# Patient Record
Sex: MALE | Race: BLACK OR AFRICAN AMERICAN | Employment: UNEMPLOYED | URBAN - METROPOLITAN AREA
[De-identification: names, ages, dates, MRNs, and addresses within clinical notes are randomized per-mention and may not be internally consistent; named-entity substitution may affect disease eponyms.]

---

## 2023-01-17 ENCOUNTER — HOSPITAL ENCOUNTER (EMERGENCY)
Age: 63
Discharge: HOME | End: 2023-01-17
Attending: EMERGENCY MEDICINE

## 2023-01-17 VITALS
HEART RATE: 78 BPM | TEMPERATURE: 97.6 F | SYSTOLIC BLOOD PRESSURE: 202 MMHG | OXYGEN SATURATION: 93 % | RESPIRATION RATE: 12 BRPM | DIASTOLIC BLOOD PRESSURE: 102 MMHG

## 2023-01-17 DIAGNOSIS — R41.82 ALTERED MENTAL STATUS, UNSPECIFIED ALTERED MENTAL STATUS TYPE: Primary | ICD-10-CM

## 2023-01-17 LAB
CHP ED QC CHECK: YES
GLUCOSE BLD-MCNC: 172 MG/DL
GLUCOSE BLD-MCNC: 172 MG/DL (ref 75–110)

## 2023-01-17 PROCEDURE — 99283 EMERGENCY DEPT VISIT LOW MDM: CPT

## 2023-01-17 PROCEDURE — 93005 ELECTROCARDIOGRAM TRACING: CPT | Performed by: STUDENT IN AN ORGANIZED HEALTH CARE EDUCATION/TRAINING PROGRAM

## 2023-01-17 PROCEDURE — 82947 ASSAY GLUCOSE BLOOD QUANT: CPT

## 2023-01-17 PROCEDURE — 6370000000 HC RX 637 (ALT 250 FOR IP): Performed by: STUDENT IN AN ORGANIZED HEALTH CARE EDUCATION/TRAINING PROGRAM

## 2023-01-17 RX ORDER — NALOXONE HYDROCHLORIDE 4 MG/.1ML
1 SPRAY NASAL ONCE
Status: COMPLETED | OUTPATIENT
Start: 2023-01-17 | End: 2023-01-17

## 2023-01-17 RX ADMIN — NALOXONE HYDROCHLORIDE NASAL SPRAY 1 SPRAY: 4 INHALANT NASAL at 21:25

## 2023-01-17 ASSESSMENT — ENCOUNTER SYMPTOMS
FACIAL SWELLING: 0
ABDOMINAL PAIN: 0
SHORTNESS OF BREATH: 0
ALLERGIC/IMMUNOLOGIC COMMENTS: NKA
BACK PAIN: 0

## 2023-01-17 ASSESSMENT — PAIN - FUNCTIONAL ASSESSMENT: PAIN_FUNCTIONAL_ASSESSMENT: NONE - DENIES PAIN

## 2023-01-18 LAB
EKG ATRIAL RATE: 84 BPM
EKG P AXIS: 75 DEGREES
EKG P-R INTERVAL: 184 MS
EKG Q-T INTERVAL: 406 MS
EKG QRS DURATION: 104 MS
EKG QTC CALCULATION (BAZETT): 479 MS
EKG R AXIS: 17 DEGREES
EKG T AXIS: 72 DEGREES
EKG VENTRICULAR RATE: 84 BPM

## 2023-01-18 PROCEDURE — 93010 ELECTROCARDIOGRAM REPORT: CPT | Performed by: INTERNAL MEDICINE

## 2023-01-18 NOTE — ED PROVIDER NOTES
[facility]     Emergency Department     Faculty Attestation    I performed a history and physical examination of the patient and discussed management with the resident. I reviewed the residents note and agree with the documented findings and plan of care. Any areas of disagreement are noted on the chart. I was personally present for the key portions of any procedures. I have documented in the chart those procedures where I was not present during the key portions. I have reviewed the emergency nurses triage note. I agree with the chief complaint, past medical history, past surgical history, allergies, medications, social and family history as documented unless otherwise noted below. For Physician Assistant/ Nurse Practitioner cases/documentation I have personally evaluated this patient and have completed at least one if not all key elements of the E/M (history, physical exam, and MDM). Additional findings are as noted. I have personally seen and evaluated the patient. I find the patient's history and physical exam are consistent with the NP/PA documentation. I agree with the care provided, treatment rendered, disposition and follow-up plan. 52-year-old male presenting in police custody after suspected overdose. Patient was found unconscious across from a home that per police is a known drug house. Patient denies using any substances other than smoking a joint. He was given 2 mg of IV Narcan prior to arrival (approximately 1 hour before arrival). He denies any chest pain or shortness of breath. He does have a history of high blood pressure and is not currently on medications. Exam:  General : Laying on the bed, awake, alert, and in no acute distress  CV : normal rate and regular rhythm  Lungs : Breathing comfortably on room air with no tachypnea, hypoxia, or increased work of breathing    Plan:  Patient asymptomatic at this time.   Although he is hypertensive, will recommend that he follows up with his PCP for initiation of antihypertensives as he is asymptomatic. Will monitor to ensure no recurrence of altered mental status or bradypnea requiring additional Narcan doses. If no further doses are needed, will discharge.     Kimmy Han MD   Attending Emergency Physician    (Please note that portions of this note were completed with a voice recognition program. Efforts were made to edit the dictations but occasionally words are mis-transcribed.)            Kimmy Han MD  01/18/23 0002

## 2023-01-18 NOTE — DISCHARGE INSTRUCTIONS
Medically cleared to return to police custody. Thank you for coming to [facility]. [place]'s emergency department! You were seen today for altered mental status. Please establish care with a primary care provider. For any concerns please return to the emergency department.

## 2023-01-18 NOTE — ED TRIAGE NOTES
Police on site after a call from unknown person stated pt appeared to be over dosing   Pt states he smoked a joint   Denies any other substance use  Fire medic gave 2 mg Iv Narcan , pt woke up alert and oriented   Talking in clear sentences, states he here for high blood pressure issues

## 2023-01-18 NOTE — ED PROVIDER NOTES
[address] ED  Emergency Department Encounter  Emergency Medicine Resident     Pt Khurram Li  MRN: [id number]  [place] 1960  Date of evaluation: 1/17/23  PCP:  No primary care provider on file. Note Started: 8:51 PM EST      CHIEF COMPLAINT       Chief Complaint   Patient presents with    Drug Overdose     Received 2mg narcan via nasal in police custody      HISTORY OF PRESENT ILLNESS  (Location/Symptom, Timing/Onset, Context/Setting, Quality, Duration, Modifying Factors, Severity.)      Ethan Woodruff is a 65 y.o. male who presents after being given 2 mg of Narcan nasally by police. Patient reports that he was found unconscious. Denies any drug use today. Denies any complaints. Denies any pain anywhere. Patient does report that he has a history of high blood pressure and diabetes for which he takes no medications. Does not see a primary care provider. Has not checked his blood sugar recently. Per police staff, patient was found unconscious and given Narcan.     PAST MEDICAL / SURGICAL / SOCIAL / FAMILY HISTORY   PMH hypertension, diabetes  PSH none    Social History     Socioeconomic History    Marital status: Single     Spouse name: Not on file    Number of children: Not on file    Years of education: Not on file    Highest education level: Not on file   Occupational History    Not on file   Tobacco Use    Smoking status: Not on file    Smokeless tobacco: Not on file   Substance and Sexual Activity    Alcohol use: Not on file    Drug use: Not on file    Sexual activity: Not on file   Other Topics Concern    Not on file   Social History Narrative    Not on file     Social Determinants of Health     Financial Resource Strain: Not on file   Food Insecurity: Not on file   Transportation Needs: Not on file   Physical Activity: Not on file   Stress: Not on file   Social Connections: Not on file   Intimate Partner Violence: Not on file   Housing Stability: Not on file No family history on file. Allergies:  Patient has no known allergies. Home Medications:  Prior to Admission medications    Not on File     REVIEW OF SYSTEMS       Review of Systems   Constitutional:  Negative for appetite change. HENT:  Negative for facial swelling. Respiratory:  Negative for shortness of breath. Cardiovascular:  Negative for chest pain. Gastrointestinal:  Negative for abdominal pain. Musculoskeletal:  Negative for back pain. Skin:  Negative for wound. Allergic/Immunologic:        NKA   Neurological:  Negative for headaches. Hematological:         - AC   Psychiatric/Behavioral:  Negative for confusion. PHYSICAL EXAM      INITIAL VITALS:   BP (!) 202/102   Pulse 78   Temp 97.6 °F (36.4 °C) (Oral)   Resp 12   SpO2 93%     Physical Exam  Constitutional:       General: He is not in acute distress. HENT:      Head: Normocephalic and atraumatic. Right Ear: External ear normal.      Left Ear: External ear normal.      Nose: Nose normal.   Eyes:      Conjunctiva/sclera: Conjunctivae normal.   Cardiovascular:      Rate and Rhythm: Normal rate. Pulses: Normal pulses. Pulmonary:      Effort: Pulmonary effort is normal. No respiratory distress. Abdominal:      General: Abdomen is flat. There is no distension. Palpations: Abdomen is soft. Tenderness: There is no abdominal tenderness. There is no guarding or rebound. Musculoskeletal:         General: No swelling. Cervical back: No tenderness. Skin:     General: Skin is warm. Capillary Refill: Capillary refill takes less than 2 seconds. Neurological:      Mental Status: He is alert and oriented to person, place, and time.    Psychiatric:         Mood and Affect: Mood normal.     DDX/DIAGNOSTIC RESULTS / EMERGENCY DEPARTMENT COURSE / MDM     Medical Decision Making  Patient is a 72-year-old gentleman presenting after being found unconscious and receiving 2 mg of Narcan at 8 PM.  He does have a history of high blood pressure and diabetes for which she takes no medications and does not see a physician for. He denies any drug use. Examination is benign, lungs clear to auscultation bilaterally, abdomen soft nontender nondistended. Patient is alert and oriented x3. Blood sugar was checked and is within normal limits. Plan to discharge at this time. Amount and/or Complexity of Data Reviewed  Independent Historian:      Details: police  Labs: ordered. Decision-making details documented in ED Course. Risk  Prescription drug management. EMERGENCY DEPARTMENT COURSE:    ED Course as of 01/17/23 2134 Tue Jan 17, 2023 2045 Glucose, Random: 172 [ML]   2112 Patient denies any drug use however did regain consciousness after Narcan use this evening. We will be giving a Narcan kit to go. Medically cleared to return to police custody. [ML]      ED Course User Index  [ML] Amada Alvarez DO       FINAL IMPRESSION      1.  Altered mental status, unspecified altered mental status type          DISPOSITION / PLAN     DISPOSITION Decision To Discharge 01/17/2023 08:55:09 PM    PATIENT REFERRED TO:  [facility]  [address] [address] [phone]  Schedule an appointment as soon as possible for a visit       Ibis Ford DO  Emergency Medicine Resident    (Please note that portions of thisnote were completed with a voice recognition program.  Efforts were made to edit the dictations but occasionally words are mis-transcribed.)       Amada Alvarez DO  Resident  01/17/23 2134

## 2023-03-12 ENCOUNTER — HOSPITAL ENCOUNTER (EMERGENCY)
Age: 63
Discharge: HOME | End: 2023-03-13
Attending: EMERGENCY MEDICINE
Payer: COMMERCIAL

## 2023-03-12 DIAGNOSIS — R04.0 EPISTAXIS: Primary | ICD-10-CM

## 2023-03-12 PROCEDURE — 99283 EMERGENCY DEPT VISIT LOW MDM: CPT

## 2023-03-12 PROCEDURE — 93005 ELECTROCARDIOGRAM TRACING: CPT

## 2023-03-12 PROCEDURE — 6370000000 HC RX 637 (ALT 250 FOR IP)

## 2023-03-12 PROCEDURE — 30901 CONTROL OF NOSEBLEED: CPT

## 2023-03-12 RX ORDER — OXYMETAZOLINE HYDROCHLORIDE 0.05 G/100ML
1 SPRAY NASAL ONCE
Status: COMPLETED | OUTPATIENT
Start: 2023-03-12 | End: 2023-03-12

## 2023-03-12 RX ADMIN — NASAL DECONGESTANT 1 SPRAY: 0.05 SPRAY NASAL at 23:36

## 2023-03-12 ASSESSMENT — ENCOUNTER SYMPTOMS
VOMITING: 0
COLOR CHANGE: 0
SINUS PAIN: 1
SHORTNESS OF BREATH: 0
FACIAL SWELLING: 0
NAUSEA: 0

## 2023-03-13 VITALS
RESPIRATION RATE: 16 BRPM | HEART RATE: 98 BPM | HEIGHT: 73 IN | WEIGHT: 190 LBS | BODY MASS INDEX: 25.18 KG/M2 | DIASTOLIC BLOOD PRESSURE: 95 MMHG | OXYGEN SATURATION: 94 % | TEMPERATURE: 98.3 F | SYSTOLIC BLOOD PRESSURE: 165 MMHG

## 2023-03-13 VITALS
SYSTOLIC BLOOD PRESSURE: 152 MMHG | OXYGEN SATURATION: 95 % | HEART RATE: 88 BPM | WEIGHT: 190 LBS | TEMPERATURE: 98.5 F | RESPIRATION RATE: 18 BRPM | DIASTOLIC BLOOD PRESSURE: 90 MMHG

## 2023-03-13 DIAGNOSIS — R04.0 EPISTAXIS: Primary | ICD-10-CM

## 2023-03-13 LAB
ABSOLUTE EOS #: 0.15 K/UL (ref 0–0.44)
ABSOLUTE IMMATURE GRANULOCYTE: 0.04 K/UL (ref 0–0.3)
ABSOLUTE LYMPH #: 0.94 K/UL (ref 1.1–3.7)
ABSOLUTE MONO #: 0.42 K/UL (ref 0.1–1.2)
BASOPHILS # BLD: 1 % (ref 0–2)
BASOPHILS ABSOLUTE: 0.03 K/UL (ref 0–0.2)
EOSINOPHILS RELATIVE PERCENT: 4 % (ref 1–4)
HCT VFR BLD AUTO: 33.1 % (ref 40.7–50.3)
HGB BLD-MCNC: 10.6 G/DL (ref 13–17)
IMMATURE GRANULOCYTES: 1 %
INR PPP: 1
LYMPHOCYTES # BLD: 22 % (ref 24–43)
MCH RBC QN AUTO: 27.9 PG (ref 25.2–33.5)
MCHC RBC AUTO-ENTMCNC: 32 G/DL (ref 28.4–34.8)
MCV RBC AUTO: 87.1 FL (ref 82.6–102.9)
MONOCYTES # BLD: 10 % (ref 3–12)
NRBC AUTOMATED: 0 PER 100 WBC
PARTIAL THROMBOPLASTIN TIME: 23.8 SEC (ref 20.5–30.5)
PDW BLD-RTO: 13.1 % (ref 11.8–14.4)
PLATELET # BLD AUTO: 223 K/UL (ref 138–453)
PMV BLD AUTO: 10.3 FL (ref 8.1–13.5)
PROTHROMBIN TIME: 10.4 SEC (ref 9.1–12.3)
RBC # BLD: 3.8 M/UL (ref 4.21–5.77)
SEG NEUTROPHILS: 62 % (ref 36–65)
SEGMENTED NEUTROPHILS ABSOLUTE COUNT: 2.75 K/UL (ref 1.5–8.1)
WBC # BLD AUTO: 4.3 K/UL (ref 3.5–11.3)

## 2023-03-13 PROCEDURE — 99283 EMERGENCY DEPT VISIT LOW MDM: CPT

## 2023-03-13 PROCEDURE — 85025 COMPLETE CBC W/AUTO DIFF WBC: CPT

## 2023-03-13 PROCEDURE — 85730 THROMBOPLASTIN TIME PARTIAL: CPT

## 2023-03-13 PROCEDURE — 85610 PROTHROMBIN TIME: CPT

## 2023-03-13 ASSESSMENT — ENCOUNTER SYMPTOMS
SHORTNESS OF BREATH: 0
FACIAL SWELLING: 0
SINUS PAIN: 0
VOMITING: 0
SINUS PRESSURE: 0
NAUSEA: 0
RHINORRHEA: 0
CHOKING: 0
COUGH: 0
TROUBLE SWALLOWING: 0
COLOR CHANGE: 0

## 2023-03-13 ASSESSMENT — PAIN - FUNCTIONAL ASSESSMENT: PAIN_FUNCTIONAL_ASSESSMENT: NONE - DENIES PAIN

## 2023-03-13 NOTE — DISCHARGE INSTRUCTIONS
You were seen and evaluated by emergency medicine physicians at [address] [place] [address] Drive had a nosebleed and high blood pressure; brought in by EMS. Your blood pressure improved in the emergency department. Your nosebleed was stopped with the application of a nose clamp and Afrin. You are being discharged with a nose clamp and instructions to reapply as these nosebleeds can recur. Keep nose clamp in place for 20 minutes. If the bleeding does not stop, please go to the nearest ED. Try to avoid going in and out of cold exterior environments into warm wet environments, which can cause nosebleeds. Please return to emergency room if experiencing the following symptoms acutely: Nosebleed that will not stop, dizziness, syncope, fatigue, spitting up blood, nausea, vomiting, chest pain, shortness of breath, abdominal pain, changes urination, change in bowel moods, pallor, changes in your skin/hair/nails and/or any change from baseline health.

## 2023-03-13 NOTE — ED PROVIDER NOTES
[facility] ED  Emergency Department  Emergency Medicine Resident Sign-out     Care of Dinah Trotter was assumed from Dr. Hector Olea and is being seen for Epistaxis (Pt arrives per EMS with epistaxis. Pt was seen earlier in ED for same complaint and discharge home. Pt reports he was sleeping and woke up around 0545 when he noticed bilateral nares bleeding. )  . The patient's initial evaluation and plan have been discussed with the prior provider who initially evaluated the patient. EMERGENCY DEPARTMENT COURSE / MEDICAL DECISION MAKING:       MEDICATIONS GIVEN:  No orders of the defined types were placed in this encounter. LABS / RADIOLOGY:     Labs Reviewed   CBC WITH AUTO DIFFERENTIAL - Abnormal; Notable for the following components:       Result Value    RBC 3.80 (*)     Hemoglobin 10.6 (*)     Hematocrit 33.1 (*)     Lymphocytes 22 (*)     Immature Granulocytes 1 (*)     Absolute Lymph # 0.94 (*)     All other components within normal limits   PROTIME-INR   APTT       No results found. RECENT VITALS:     Temp: 98.3 °F (36.8 °C),  Heart Rate: 98, Resp: 16, BP: (!) 165/95, SpO2: 94 %      This patient is a 65 y.o. Male with recurrent severe epistaxis. Seen in the ED less than 5 hours ago. Nose clamp and Afrin stop the bleed. Went home took his medications and woke up with new onset epistaxis. Currently not bleeding following 20-minute clamp. Ordered CBC and coags to rule out platelet and coagulation disorder. ED Course as of 03/13/23 1210   Mon Mar 13, 2023   0703 Patient is a 60-year-old male who presents to the emergency department within 4 hours of discharge for recurrent epistaxis from bilateral nares. Patient went home, took his medications and went to sleep; he then woke up felt drainage out of his nose and the back of his throat. Patient states that he used the nasal clamp that he went home with to no effect.     Patient denies any headache, fever, chills, shortness of breath, dizziness, fatigue, palpitations, nausea, vomiting. Concern for recurrent epistaxis bleed from anterior or posterior source. Unable to visualize a source of the bleeding using otoscope in the anterior part of the patient's nose. Patient has large clots that were firmly pulled from his bilateral nares and he coughed up several large blood clots. [AS]   3401 Will order CBC and coags as this is the 3rd severe nose bleed. [AS]   [id number] Care assumed. [SS]   5136 Will ambulate patient and reassess  [SS]   0848 Ambulated with no bleed, states he feels better [SS]      ED Course User Index  [AS] Ba Dhillon DO  [SS] Erika Price MD       OUTSTANDING TASKS / RECOMMENDATIONS:    F/u labs     FINAL IMPRESSION:     1. Epistaxis        DISPOSITION:         DISPOSITION:  [x]  Discharge   []  Transfer -    []  Admission -     []  Against Medical Advice   []  Eloped   FOLLOW-UP: [address] [place] [id number] [phone]       DISCHARGE MEDICATIONS: There are no discharge medications for this patient.          Erika Price MD  Emergency Medicine Resident  [facility]         Erika Price MD  Resident  03/13/23 1402

## 2023-03-13 NOTE — ED NOTES
Patient requesting SW set up a ride for him back to [place]. SW called there and they will send staff to pick patient up from the ED. Patient updated on ride status. Eva Guajardo.  [address], [address]  03/13/23 2809

## 2023-03-13 NOTE — ED NOTES
Report given to Sridevi OLIVEIRA  No change in patient status  Continues to rest quietly       Kailee Sosa RN  03/13/23 0009

## 2023-03-13 NOTE — ED NOTES
Epistaxis appears to have slowed down extremely  Clamp remains on nose  Dr. Chang Goins at bedside to reassess pt     Enedina Padron RN  03/13/23 0000

## 2023-03-13 NOTE — ED NOTES
Bleeding to bilateral nares currently controlled at this time after applying pressure to bridge of nose with clamp. Will cont to monitor. Call light within reach.       Reynaldo Reis RN  03/13/23 0870

## 2023-03-13 NOTE — ED PROVIDER NOTES
[address]  ED  Emergency Department Encounter  Emergency Medicine Resident     Pt Natalio Navarro  MRN: [id number]  [place] 1960  Date of evaluation: 3/13/23  PCP:  No primary care provider on file. Note Started: 7:03 AM EDT      CHIEF COMPLAINT       Chief Complaint   Patient presents with    Epistaxis     Pt arrives per EMS with epistaxis. Pt was seen earlier in ED for same complaint and discharge home. Pt reports he was sleeping and woke up around 0545 when he noticed bilateral nares bleeding. HISTORY OF PRESENT ILLNESS  (Location/Symptom, Timing/Onset, Context/Setting, Quality, Duration, Modifying Factors, Severity.)      Akilah Reddy is a 65 y.o. male who presents to the emergency department within 4 hours of discharge for recurrent epistaxis from bilateral nares. Patient went home, took his medications and went to sleep; he then woke up felt drainage out of his nose and the back of his throat. Patient states that he used the nasal clamp that he went home with to no effect. Patient denies any headache, fever, chills, shortness of breath, dizziness, fatigue, palpitations, nausea, vomiting. Patient does not have a Hx of  epistaxis prior to these back-to-back bleeds. Does not pick his nose. He is not on any anticoagulants. PAST MEDICAL / SURGICAL / SOCIAL / FAMILY HISTORY      has a past medical history of Hypertension. Severe nose bleeds     has no past surgical history on file.   No reported surgery    Social History     Socioeconomic History    Marital status: Single     Spouse name: Not on file    Number of children: Not on file    Years of education: Not on file    Highest education level: Not on file   Occupational History    Not on file   Tobacco Use    Smoking status: Every Day     Types: Cigarettes    Smokeless tobacco: Not on file   Substance and Sexual Activity    Alcohol use: Not Currently    Drug use: Never    Sexual activity: Not on file Other Topics Concern    Not on file   Social History Narrative    Not on file     Social Determinants of Health     Financial Resource Strain: Not on file   Food Insecurity: Not on file   Transportation Needs: Not on file   Physical Activity: Not on file   Stress: Not on file   Social Connections: Not on file   Intimate Partner Violence: Not on file   Housing Stability: Not on file       History reviewed. No pertinent family history. Allergies:  Patient has no known allergies. Home Medications:  Prior to Admission medications    Not on File       REVIEW OF SYSTEMS       Review of Systems   Constitutional:  Negative for activity change and appetite change. HENT:  Positive for congestion, nosebleeds and postnasal drip. Negative for facial swelling, rhinorrhea, sinus pressure, sinus pain and trouble swallowing. Eyes:  Negative for visual disturbance. Respiratory:  Negative for cough, choking and shortness of breath. Cardiovascular:  Negative for chest pain. Gastrointestinal:  Negative for nausea and vomiting. Skin:  Negative for color change and pallor. Neurological:  Negative for dizziness, weakness, light-headedness, numbness and headaches. PHYSICAL EXAM      INITIAL VITALS:   BP (!) 165/95   Pulse 98   Temp 98.3 °F (36.8 °C) (Oral)   Resp 16   Ht 6' 1\" (1.854 m)   Wt 190 lb (86.2 kg)   SpO2 94%   BMI 25.07 kg/m²     Physical Exam  Vitals and nursing note reviewed. Constitutional:       General: He is not in acute distress. Appearance: Normal appearance. He is normal weight. He is not ill-appearing, toxic-appearing or diaphoretic. HENT:      Head: Normocephalic. Right Ear: External ear normal.      Left Ear: External ear normal.      Nose: Congestion present.       Comments: Bilateral epistaxis with large clots in the nose; patient is coughing up large bloody clots from oral pharynx     Mouth/Throat:      Pharynx: No oropharyngeal exudate or posterior oropharyngeal erythema. Eyes:      General: No scleral icterus. Right eye: No discharge. Left eye: No discharge. Extraocular Movements: Extraocular movements intact. Conjunctiva/sclera: Conjunctivae normal.      Pupils: Pupils are equal, round, and reactive to light. Cardiovascular:      Rate and Rhythm: Normal rate and regular rhythm. Pulses: Normal pulses. Heart sounds: Normal heart sounds. Pulmonary:      Effort: Pulmonary effort is normal.      Breath sounds: Normal breath sounds. Abdominal:      General: Abdomen is flat. Palpations: Abdomen is soft. Musculoskeletal:         General: No swelling, tenderness, deformity or signs of injury. Normal range of motion. Cervical back: Normal range of motion. Right lower leg: No edema. Left lower leg: No edema. Skin:     General: Skin is warm and dry. Capillary Refill: Capillary refill takes less than 2 seconds. Neurological:      General: No focal deficit present. Mental Status: He is alert and oriented to person, place, and time. Psychiatric:         Mood and Affect: Mood normal.         Behavior: Behavior normal.         Thought Content: Thought content normal.         Judgment: Judgment normal.         DDX/DIAGNOSTIC RESULTS / EMERGENCY DEPARTMENT COURSE / MDM     Medical Decision Making  See ED course. Amount and/or Complexity of Data Reviewed  External Data Reviewed: notes. Labs: ordered. Critical Care  Total time providing critical care: < 30 minutes      EKG    All EKG's are interpreted by the Emergency Department Physician who either signs or Co-signs this chart in the absence of a cardiologist.    EMERGENCY DEPARTMENT COURSE:    ED Course as of 03/13/23 0746   Mon Mar 13, 2023   0703 Patient is a 73-year-old male who presents to the emergency department within 4 hours of discharge for recurrent epistaxis from bilateral nares.   Patient went home, took his medications and went to sleep; he then woke up felt drainage out of his nose and the back of his throat. Patient states that he used the nasal clamp that he went home with to no effect. Patient denies any headache, fever, chills, shortness of breath, dizziness, fatigue, palpitations, nausea, vomiting. Concern for recurrent epistaxis bleed from anterior or posterior source. Unable to visualize a source of the bleeding using otoscope in the anterior part of the patient's nose. Patient has large clots that were firmly pulled from his bilateral nares and he coughed up several large blood clots. [AS]   7985 Will order CBC and coags as this is the 3rd severe nose bleed. [AS]   [id number] Care assumed. [SS]      ED Course User Index  [AS] Erika Dutta DO  [SS] Corrinne Hipps, MD       PROCEDURES:  None    CONSULTS:  None    CRITICAL CARE:  There was significant risk of life threatening deterioration of patient's condition requiring my direct management. Critical care time < 30 minutes, excluding any documented procedures. FINAL IMPRESSION      1. Epistaxis          DISPOSITION / PLAN     DISPOSITION    -Pending final work up with labs. Patient may need nasal packing. PATIENT REFERRED TO:  No follow-up provider specified.     DISCHARGE MEDICATIONS:  New Prescriptions    No medications on file       Avel Hatch DO  Emergency Medicine Resident    (Please note that portions of thisnote were completed with a voice recognition program.  Efforts were made to edit the dictations but occasionally words are mis-transcribed.)       Erika Dutta DO  Resident  03/13/23 1044

## 2023-03-13 NOTE — ED NOTES
Dr. Jose Clark @ bedside to evaluate pt. Nose clamp placed and pt instructed to keep in place for 20 minutes. Pt voices understanding. Call light within reach. Will cont to monitor.       Dorota Chapa RN  03/13/23 2855

## 2023-03-13 NOTE — PROGRESS NOTES
This patient was assigned to me by error. This patient was never interviewed nor examined by me. I did not participate in the care of this patient.     Maryanne Neely, PGY-3

## 2023-03-13 NOTE — ED PROVIDER NOTES
[facility]   Emergency Department  Faculty Attestation       I performed a history and physical examination of the patient and discussed management with the resident. I reviewed the residents note and agree with the documented findings including all diagnostic interpretations and plan of care. Any areas of disagreement are noted on the chart. I was personally present for the key portions of any procedures. I have documented in the chart those procedures where I was not present during the key portions. I have reviewed the emergency nurses triage note. I agree with the chief complaint, past medical history, past surgical history, allergies, medications, social and family history as documented unless otherwise noted below. Documentation of the HPI, Physical Exam and Medical Decision Making performed by alex is based on my personal performance of the HPI, PE and MDM. For Physician Assistant/ Nurse Practitioner cases/documentation I have personally evaluated this patient and have completed at least one if not all key elements of the E/M (history, physical exam, and MDM). Additional findings are as noted. Pertinent Comments     Primary Care Physician: No primary care provider on file. ED Triage Vitals [03/12/23 2322]   BP Temp Temp src Pulse Resp SpO2 Height Weight   (!) 174/106 -- -- -- -- -- -- 190 lb (86.2 kg)        This is a 65 y.o. male who presents to the Emergency Department with epistaxis. This has been happening intermittently. Does have a history of high blood pressure, has been taking his medications. No history of prior nosebleeds. Not any blood thinners. Not feeling lightheaded or dizzy. On exam, awake alert no acute distress. Normocephalic atraumatic. Does have blood in bilateral nares. Unclear which side is coming from. No obvious nasal bridge trauma. Awake and alert with no focal neurodeficits. Medical Decision Making  Epistaxis  No history of nosebleeds.   Does have clots in bilateral naris. No recent trauma  Mildly elevated blood pressure, but no indication for treatment of this at this time. Not on blood thinners. Given Afrin and clamped. Epistaxis did stop, monitored for approximately 30 minutes after this. Was initially discharged, and when walking out to the lobby started having repeat nosebleed. Reclamped. And then will reevaluate    Problems Addressed:  Epistaxis: acute illness or injury    Risk  OTC drugs.          Critical Care: None     Carlie Chatterjee MD  Attending Emergency Physician        Carlie Chatterjee MD  03/13/23 1164

## 2023-03-13 NOTE — DISCHARGE INSTRUCTIONS
Call today / tomorrow for a follow up with   in  days. Nonprescription saline nasal sprays and nose drops can be used to keep your nasal tissues moist, relieve nasal irritation and help thick or dried mucus to drain. You can also use Vaseline to the inside of your nose. Saline nose drops can be purchased over the counter or can be made easily at home:  Step 1  Boil tap water for 10 minutes to remove all of the impurities and get the water hot enough to dissolve the salt. Pour one cup of the boiling hot water into a liquid measuring cup with a spout (to pour easily later). Step 2  Add one teaspoon of baking soda and one teaspoon of sea salt to the boiling water in the measuring cup. Stir until the water is completely clear again, which insures that the baking soda and salt dissolve completely. Step 3  Allow the water to cool to room temperature. Then, pour the water into a clean nasal spray bottle (available at [facility] While lying on the bed with your head hanging over the side of the bed, squeeze the saline nasal spray one to three times into each nostril, inhaling through the nose as you squeeze in the fluid. If you do not have a dropper, then use a bulb syringe and gently squirt the solution into your nose, or snuff the solution from the palm of your hand, one nostril at a time. Step 4  Store the remaining saline nasal spray in glass jar or plastic container with a lid. Refill your nasal spray bottle as needed. Make a fresh solution every 3 days. Use the saline spray, or obtain a humidifier for your bed room. Return to the emergency department for worsening of pain, return of nose bleed, fever > 101.5, excessive nausea or vomiting, any other care or concern.

## 2023-03-13 NOTE — ED NOTES
Patient presents with epistaxis, started 35-45 min ago  No ringing in ears, no h/a, slight lightheadedess  Dr. Lindsay Pichardo at bedside  Pt is a/o, skin warm and dry  Patient placed on cardiac monitor, BP cuff and pulse ox. Alarms set.   Call light at side  Pressure being held to nose for epistaxis     Julio Herron RN  03/12/23 1655

## 2023-03-13 NOTE — ED PROVIDER NOTES
[facility] ED  Emergency Department Encounter  Emergency Medicine Resident     Pt Mckenna Connelly  MRN: [id number]  [place] 1960  Date of evaluation: 3/12/23  PCP:  No primary care provider on file. Note Started: 11:45 PM EDT      CHIEF COMPLAINT       Chief Complaint   Patient presents with    Hypertension     Missed medication doses    Epistaxis     Started about 35-45 min ago       HISTORY OF PRESENT ILLNESS  (Location/Symptom, Timing/Onset, Context/Setting, Quality, Duration, Modifying Factors, Severity.)      Dinah Trotter is a 65 y.o. male w/ a history of hypertension who was brought in by EMS for approximately an hour and 40 minutes of bilateral bright red blood from his nose. Patient states that he felt like his blood pressure was elevated within the nosebleed started. Patient denies any history of nosebleeds and is not on any anticoagulants. Patient has a history of smoking approximately half a pack per day. He has been going outside in the cold weather several times a day to smoke. Denies any other recreational drugs or alcohol use. PAST MEDICAL / SURGICAL / SOCIAL / FAMILY HISTORY      has a past medical history of Hypertension. has no past surgical history on file.   No reported past surgical history    Social History     Socioeconomic History    Marital status: Single     Spouse name: Not on file    Number of children: Not on file    Years of education: Not on file    Highest education level: Not on file   Occupational History    Not on file   Tobacco Use    Smoking status: Every Day     Types: Cigarettes    Smokeless tobacco: Not on file   Substance and Sexual Activity    Alcohol use: Not Currently    Drug use: Never    Sexual activity: Not on file   Other Topics Concern    Not on file   Social History Narrative    Not on file     Social Determinants of Health     Financial Resource Strain: Not on file   Food Insecurity: Not on file   Transportation Needs: Not on file   Physical Activity: Not on file   Stress: Not on file   Social Connections: Not on file   Intimate Partner Violence: Not on file   Housing Stability: Not on file       History reviewed. No pertinent family history. Allergies:  Patient has no known allergies. Home Medications:  Prior to Admission medications    Not on File       REVIEW OF SYSTEMS       Review of Systems   Constitutional:  Positive for activity change. Negative for appetite change, chills, diaphoresis and fever. HENT:  Positive for nosebleeds, postnasal drip and sinus pain. Negative for facial swelling. Eyes:  Negative for visual disturbance. Respiratory:  Negative for shortness of breath. Cardiovascular:  Negative for chest pain. Gastrointestinal:  Negative for nausea and vomiting. Musculoskeletal:  Negative for arthralgias. Skin:  Negative for color change and pallor. Neurological:  Negative for weakness, light-headedness and headaches. PHYSICAL EXAM      INITIAL VITALS:   BP (!) 152/90   Pulse 88   Temp 98.5 °F (36.9 °C) (Oral)   Resp 18   Wt 190 lb (86.2 kg)   SpO2 95%     Physical Exam  Vitals and nursing note reviewed. Constitutional:       General: He is not in acute distress. Appearance: Normal appearance. He is normal weight. He is not ill-appearing, toxic-appearing or diaphoretic. HENT:      Head: Normocephalic and atraumatic. Right Ear: External ear normal.      Left Ear: External ear normal.      Nose: Congestion present. No rhinorrhea. Comments: Bilateral epistaxis with large clots and continued bleeding. Mouth/Throat:      Mouth: Mucous membranes are moist.      Pharynx: No oropharyngeal exudate or posterior oropharyngeal erythema. Comments: Blood in oral cavity. No active bleeding site. Large intensity of blood in oropharynx. Patterning is consistent with expectoration of blood from oropharynx. Eyes:      General: No scleral icterus.         Right eye: No discharge. Left eye: No discharge. Extraocular Movements: Extraocular movements intact. Conjunctiva/sclera: Conjunctivae normal.      Pupils: Pupils are equal, round, and reactive to light. Cardiovascular:      Rate and Rhythm: Regular rhythm. Tachycardia present. Pulses: Normal pulses. Heart sounds: Normal heart sounds. Pulmonary:      Effort: Pulmonary effort is normal.      Breath sounds: Normal breath sounds. Abdominal:      General: Abdomen is flat. Palpations: Abdomen is soft. Musculoskeletal:         General: Normal range of motion. Cervical back: Normal range of motion. Skin:     General: Skin is warm and dry. Capillary Refill: Capillary refill takes less than 2 seconds. Neurological:      General: No focal deficit present. Mental Status: He is alert and oriented to person, place, and time. Psychiatric:         Mood and Affect: Mood normal.         Behavior: Behavior normal.         Thought Content: Thought content normal.         Judgment: Judgment normal.         DDX/DIAGNOSTIC RESULTS / EMERGENCY DEPARTMENT COURSE / MDM     Medical Decision Making  Risk  OTC drugs. EKG    All EKG's are interpreted by the Emergency Department Physician who either signs or Co-signs this chart in the absence of a cardiologist.    EMERGENCY DEPARTMENT COURSE:    ED Course as of 03/13/23 0235   Mac Dykes Mar 12, 2023   218 46-year-old male with a history of hypertension who was brought in by EMS for approximately an hour and 40 minutes of bilateral bright red blood from his nose. Patient states that he felt like his blood pressure was elevated within the nosebleed started. Patient denies any history of nosebleeds and is not on any anticoagulants. Patient has a history of smoking approximately half a pack per day. He has been going outside in the cold weather several times a day to smoke. Denies any other recreational drugs or alcohol use.     On physical exam the patient has large blood clots in bilateral naris with active bright red blood running from both. Patient is also actively locking up and spitting bright red blood. He states he feels it running down the back of his throat. On the rest of the physical exam it was only noted that he was mildly tachycardic. Concern for anterior epistaxis, posterior epistaxis. We will start with nose clamp with Afrin. Following initial attempts to tamponade bleeding will reassess and have patient wash mouth out with water to see if there is posterior drainage of blood. If no cessation of bleeding will move forward with intranasal tamponade devices and potentially TXA. [AS]   4707 On reassessment patient has had a sensation of bleeding from his nose. Had patient gargle and spit out water to clear his oropharynx. Do not observe any bleeding in the oropharynx. Patient given dose of Afrin. Patient resting comfortably. [AS]   Mon Mar 13, 2023   7183 Patient reassessed. Patient resting comfortably bed in no acute distress. Patient sleeping when provider entered the room. Patient daughter has any bleeding from his bilateral nares. Discussed with patient that these nosebleeds can happen again and he should use the nose clamp which she is going home with for 20 minutes. [AS]   0127 Patient is no started bleeding when leaving the emergency department. Patient was brought back to the room and clamp reapplied for 20 minutes. [AS]   5644 Walked patient around the unit. No recurrence of his nosebleeds. Bilateral nares are clear without any clot. Patient states he is feeling better and would like the nurse to call [facility] to come pick him up.   Decision to fully discharge patient. [AS]      ED Course User Index  [AS] Shaila Mcconnell DO       PROCEDURES:  -Nasal clamp  -Intranasal Afrin spray    CONSULTS:  None    CRITICAL CARE:  There was significant risk of life threatening deterioration of patient's condition requiring my direct management. Critical care time < 30 minutes, excluding any documented procedures.     FINAL IMPRESSION      1. Epistaxis          DISPOSITION / PLAN     DISPOSITION Decision To Discharge 03/13/2023 02:23:13 AM      PATIENT REFERRED TO:  [facility] [address] [address] [phone]  Schedule an appointment as soon as possible for a visit   As needed, If symptoms worsen, for establishment of care    DISCHARGE MEDICATIONS:  New Prescriptions    No medications on file       Terrence Tavera DO  Emergency Medicine Resident    (Please note that portions of thisnote were completed with a voice recognition program.  Efforts were made to edit the dictations but occasionally words are mis-transcribed.)        Ghada Moreno DO  Resident  03/13/23 [address] [facility] 1506 ELIE Cavazos DO  Resident  03/13/23 0802

## 2023-03-13 NOTE — ED PROVIDER NOTES
[facility] ED     Emergency Department     Faculty Attestation        I performed a history and physical examination of the patient and discussed management with the resident. I reviewed the resident’s note and agree with the documented findings and plan of care. Any areas of disagreement are noted on the chart. I was personally present for the key portions of any procedures. I have documented in the chart those procedures where I was not present during the key portions. I have reviewed the emergency nurses triage note. I agree with the chief complaint, past medical history, past surgical history, allergies, medications, social and family history as documented unless otherwise noted below.  For Physician Assistant/ Nurse Practitioner cases/documentation I have personally evaluated this patient and have completed at least one if not all key elements of the E/M (history, physical exam, and MDM). Additional findings are as noted.    Vital Signs: BP: (!) 165/95  Heart Rate: 98  Resp: 16  Temp: 98.3 °F (36.8 °C) SpO2: 94 %  PCP:  No primary care provider on file.    Pertinent Comments:         Critical Care  None      (Please note that portions of this note were completed with a voice recognition program. Efforts were made to edit the dictations but occasionally words are mis-transcribed. Whenever words are used in this note in any gender, they shall be construed as though they were used in the gender appropriate to the circumstances; and whenever words are used in this note in the singular or plural form, they shall be construed as though they were used in the form appropriate to the circumstances.)    Juliano Hawkins MD [facility]  Attending Emergency Medicine Physician            Juliano Hawkins MD  03/13/23 8917

## 2023-03-13 NOTE — ED NOTES
Pt resting comfortably in cot with eyes closed-respirations even and non-labored. No active bleeding from bilateral nares at this time. Call light within reach. Will cont to monitor.       Tj Monet RN  03/13/23 3680

## 2023-03-13 NOTE — ED NOTES
Nasal clamp applied by Dr. Lorna Mendez  Patient is spitting out blood  Clots blown out with tissues  SaO2 maintained at 96% on room airBleeding      Willie Rivera RN  03/12/23 9610

## 2023-03-13 NOTE — ED NOTES
Pt discharged and writer begins walking pt to the ED waiting room, when pts left nare begins bleeding. Resident and attending made aware. Pt assisted back to ED 19. Clamp applied to nose per Dr. Lemuel Quintana.       Gillian Knowles RN  03/13/23 7781

## 2023-03-14 LAB
EKG ATRIAL RATE: 106 BPM
EKG P AXIS: 98 DEGREES
EKG P-R INTERVAL: 156 MS
EKG Q-T INTERVAL: 350 MS
EKG QRS DURATION: 104 MS
EKG QTC CALCULATION (BAZETT): 464 MS
EKG R AXIS: 43 DEGREES
EKG T AXIS: 94 DEGREES
EKG VENTRICULAR RATE: 106 BPM